# Patient Record
Sex: FEMALE
[De-identification: names, ages, dates, MRNs, and addresses within clinical notes are randomized per-mention and may not be internally consistent; named-entity substitution may affect disease eponyms.]

---

## 2017-03-15 PROBLEM — Z00.00 ENCOUNTER FOR PREVENTIVE HEALTH EXAMINATION: Status: ACTIVE | Noted: 2017-03-15

## 2017-04-07 ENCOUNTER — APPOINTMENT (OUTPATIENT)
Dept: OTOLARYNGOLOGY | Facility: CLINIC | Age: 72
End: 2017-04-07

## 2017-04-07 VITALS
SYSTOLIC BLOOD PRESSURE: 122 MMHG | HEIGHT: 64.5 IN | DIASTOLIC BLOOD PRESSURE: 80 MMHG | WEIGHT: 190 LBS | HEART RATE: 87 BPM | BODY MASS INDEX: 32.04 KG/M2

## 2017-04-07 DIAGNOSIS — Z87.891 PERSONAL HISTORY OF NICOTINE DEPENDENCE: ICD-10-CM

## 2017-04-07 DIAGNOSIS — I10 ESSENTIAL (PRIMARY) HYPERTENSION: ICD-10-CM

## 2017-04-07 DIAGNOSIS — Z85.028 PERSONAL HISTORY OF OTHER MALIGNANT NEOPLASM OF STOMACH: ICD-10-CM

## 2017-04-07 DIAGNOSIS — E11.9 TYPE 2 DIABETES MELLITUS W/OUT COMPLICATIONS: ICD-10-CM

## 2017-04-07 DIAGNOSIS — Z83.3 FAMILY HISTORY OF DIABETES MELLITUS: ICD-10-CM

## 2017-04-07 RX ORDER — GLIPIZIDE 2.5 MG/1
TABLET ORAL
Refills: 0 | Status: ACTIVE | COMMUNITY

## 2017-04-07 RX ORDER — VITAMIN E ACETATE 670 MG
CAPSULE ORAL
Refills: 0 | Status: ACTIVE | COMMUNITY

## 2017-04-10 PROBLEM — I10 HYPERTENSION: Status: ACTIVE | Noted: 2017-04-10

## 2017-04-10 PROBLEM — E11.9 DIABETES: Status: ACTIVE | Noted: 2017-04-10

## 2017-04-10 PROBLEM — Z87.891 FORMER SMOKER: Status: ACTIVE | Noted: 2017-04-07

## 2017-04-10 RX ORDER — VALSARTAN AND HYDROCHLOROTHIAZIDE 160; 25 MG/1; MG/1
160-25 TABLET, FILM COATED ORAL
Qty: 90 | Refills: 0 | Status: ACTIVE | COMMUNITY
Start: 2016-09-13

## 2017-04-10 RX ORDER — METFORMIN HYDROCHLORIDE 1000 MG/1
1000 TABLET, COATED ORAL
Qty: 180 | Refills: 0 | Status: ACTIVE | COMMUNITY
Start: 2016-09-13

## 2017-05-19 VITALS
HEART RATE: 53 BPM | HEIGHT: 63 IN | RESPIRATION RATE: 17 BRPM | DIASTOLIC BLOOD PRESSURE: 67 MMHG | TEMPERATURE: 97 F | WEIGHT: 184.97 LBS | OXYGEN SATURATION: 96 % | SYSTOLIC BLOOD PRESSURE: 152 MMHG

## 2017-05-19 NOTE — ASU PATIENT PROFILE, ADULT - PMH
Heart murmur    Stomach cancer    Type 2 diabetes mellitus Fibroids    Heart murmur    HTN (hypertension)    Stomach cancer    Thyroid goiter    Type 2 diabetes mellitus

## 2017-05-19 NOTE — ASU PATIENT PROFILE, ADULT - PSH
History of surgery  stomach cancer  S/P complete hysterectomy History of surgery  stomach cancer- 2012  S/P complete hysterectomy

## 2017-05-22 ENCOUNTER — OUTPATIENT (OUTPATIENT)
Dept: OUTPATIENT SERVICES | Facility: HOSPITAL | Age: 72
LOS: 1 days | Discharge: HOME | End: 2017-05-22
Payer: MEDICARE

## 2017-05-22 ENCOUNTER — RESULT REVIEW (OUTPATIENT)
Age: 72
End: 2017-05-22

## 2017-05-22 ENCOUNTER — APPOINTMENT (OUTPATIENT)
Dept: OTOLARYNGOLOGY | Facility: HOSPITAL | Age: 72
End: 2017-05-22

## 2017-05-22 DIAGNOSIS — D64.9 ANEMIA, UNSPECIFIED: ICD-10-CM

## 2017-05-22 DIAGNOSIS — Z79.899 OTHER LONG TERM (CURRENT) DRUG THERAPY: ICD-10-CM

## 2017-05-22 DIAGNOSIS — E66.9 OBESITY, UNSPECIFIED: ICD-10-CM

## 2017-05-22 DIAGNOSIS — Z98.890 OTHER SPECIFIED POSTPROCEDURAL STATES: Chronic | ICD-10-CM

## 2017-05-22 DIAGNOSIS — E11.9 TYPE 2 DIABETES MELLITUS WITHOUT COMPLICATIONS: ICD-10-CM

## 2017-05-22 DIAGNOSIS — C73 MALIGNANT NEOPLASM OF THYROID GLAND: ICD-10-CM

## 2017-05-22 DIAGNOSIS — Z90.710 ACQUIRED ABSENCE OF BOTH CERVIX AND UTERUS: Chronic | ICD-10-CM

## 2017-05-22 LAB
CALCIUM SERPL-MCNC: 8.9 MG/DL — SIGNIFICANT CHANGE UP (ref 8.4–10.5)
PTH-INTACT IO % DIF SERPL: 60 PG/ML — SIGNIFICANT CHANGE UP (ref 8.5–72.5)

## 2017-05-22 PROCEDURE — 60240 REMOVAL OF THYROID: CPT | Mod: GC

## 2017-05-22 RX ORDER — DEXTROSE 50 % IN WATER 50 %
1 SYRINGE (ML) INTRAVENOUS ONCE
Qty: 0 | Refills: 0 | Status: DISCONTINUED | OUTPATIENT
Start: 2017-05-22 | End: 2017-05-23

## 2017-05-22 RX ORDER — MORPHINE SULFATE 50 MG/1
4 CAPSULE, EXTENDED RELEASE ORAL
Qty: 0 | Refills: 0 | Status: DISCONTINUED | OUTPATIENT
Start: 2017-05-22 | End: 2017-05-23

## 2017-05-22 RX ORDER — SODIUM CHLORIDE 9 MG/ML
1000 INJECTION, SOLUTION INTRAVENOUS
Qty: 0 | Refills: 0 | Status: DISCONTINUED | OUTPATIENT
Start: 2017-05-22 | End: 2017-05-23

## 2017-05-22 RX ORDER — INSULIN LISPRO 100/ML
VIAL (ML) SUBCUTANEOUS ONCE
Qty: 0 | Refills: 0 | Status: COMPLETED | OUTPATIENT
Start: 2017-05-22 | End: 2017-05-23

## 2017-05-22 RX ORDER — ONDANSETRON 8 MG/1
4 TABLET, FILM COATED ORAL EVERY 6 HOURS
Qty: 0 | Refills: 0 | Status: DISCONTINUED | OUTPATIENT
Start: 2017-05-22 | End: 2017-05-23

## 2017-05-22 RX ORDER — GLUCAGON INJECTION, SOLUTION 0.5 MG/.1ML
1 INJECTION, SOLUTION SUBCUTANEOUS ONCE
Qty: 0 | Refills: 0 | Status: DISCONTINUED | OUTPATIENT
Start: 2017-05-22 | End: 2017-05-23

## 2017-05-22 RX ORDER — ACETAMINOPHEN 500 MG
650 TABLET ORAL ONCE
Qty: 0 | Refills: 0 | Status: COMPLETED | OUTPATIENT
Start: 2017-05-22 | End: 2017-05-23

## 2017-05-22 RX ORDER — DEXTROSE 50 % IN WATER 50 %
25 SYRINGE (ML) INTRAVENOUS ONCE
Qty: 0 | Refills: 0 | Status: DISCONTINUED | OUTPATIENT
Start: 2017-05-22 | End: 2017-05-23

## 2017-05-22 RX ORDER — ACETAMINOPHEN WITH CODEINE 300MG-30MG
1 TABLET ORAL EVERY 4 HOURS
Qty: 0 | Refills: 0 | Status: DISCONTINUED | OUTPATIENT
Start: 2017-05-22 | End: 2017-05-23

## 2017-05-22 RX ORDER — INSULIN LISPRO 100/ML
VIAL (ML) SUBCUTANEOUS ONCE
Qty: 0 | Refills: 0 | Status: DISCONTINUED | OUTPATIENT
Start: 2017-05-22 | End: 2017-05-22

## 2017-05-22 RX ORDER — VALSARTAN 80 MG/1
160 TABLET ORAL DAILY
Qty: 0 | Refills: 0 | Status: DISCONTINUED | OUTPATIENT
Start: 2017-05-22 | End: 2017-05-23

## 2017-05-22 RX ORDER — DEXTROSE 50 % IN WATER 50 %
12.5 SYRINGE (ML) INTRAVENOUS ONCE
Qty: 0 | Refills: 0 | Status: DISCONTINUED | OUTPATIENT
Start: 2017-05-22 | End: 2017-05-23

## 2017-05-22 NOTE — BRIEF OPERATIVE NOTE - SPECIMENS
Total thyroid: 1 long indicates right superior pole, 2 long indicates left superior pole. Total thyroid gland

## 2017-05-22 NOTE — BRIEF OPERATIVE NOTE - PROCEDURE
Thyroidectomy, total  05/22/2017    [facility]  [facility]2017    Active  Elizabeth Humphries

## 2017-05-22 NOTE — BRIEF OPERATIVE NOTE - OPERATION/FINDINGS
Multinodular goiter, left lobe significantly larger in size than right.   Bilateral RLN identified and stimulated at end of case.   No parathyroids seen on specimen.   Dermabond skin closure. Multinodular goiter, left lobe significantly larger in size than right, with nodules occupying most of lobes.   Bilateral RLN identified and preserved; stimulated at end of case.   No parathyroids seen on specimen.   Bilateral superior parathyroid glands were identified and preserved.  Dermabond skin closure.

## 2017-05-22 NOTE — PROGRESS NOTE ADULT - ASSESSMENT
Assessment/Plan:  71y Female s/p total thyroidectomy on  POD0  - pain control  - anti-emetics prn  - regular diet  - ambulate  - discussed case with dr Ovalle who also examined the patient and agrees with the plan    PPX: Enrike, I/S    Dispo plannin hour

## 2017-05-23 VITALS
SYSTOLIC BLOOD PRESSURE: 122 MMHG | DIASTOLIC BLOOD PRESSURE: 56 MMHG | HEART RATE: 54 BPM | OXYGEN SATURATION: 100 % | TEMPERATURE: 99 F | RESPIRATION RATE: 18 BRPM

## 2017-05-23 PROBLEM — R01.1 CARDIAC MURMUR, UNSPECIFIED: Chronic | Status: ACTIVE | Noted: 2017-05-19

## 2017-05-23 PROBLEM — C16.9 MALIGNANT NEOPLASM OF STOMACH, UNSPECIFIED: Chronic | Status: ACTIVE | Noted: 2017-05-19

## 2017-05-23 PROBLEM — E11.9 TYPE 2 DIABETES MELLITUS WITHOUT COMPLICATIONS: Chronic | Status: ACTIVE | Noted: 2017-05-19

## 2017-05-23 LAB — CALCIUM SERPL-MCNC: 9 MG/DL — SIGNIFICANT CHANGE UP (ref 8.4–10.5)

## 2017-05-23 PROCEDURE — 60240 REMOVAL OF THYROID: CPT

## 2017-05-23 PROCEDURE — 82310 ASSAY OF CALCIUM: CPT

## 2017-05-23 PROCEDURE — 36415 COLL VENOUS BLD VENIPUNCTURE: CPT

## 2017-05-23 PROCEDURE — 86850 RBC ANTIBODY SCREEN: CPT

## 2017-05-23 PROCEDURE — 88360 TUMOR IMMUNOHISTOCHEM/MANUAL: CPT

## 2017-05-23 PROCEDURE — 86900 BLOOD TYPING SEROLOGIC ABO: CPT

## 2017-05-23 PROCEDURE — 88307 TISSUE EXAM BY PATHOLOGIST: CPT

## 2017-05-23 PROCEDURE — 83970 ASSAY OF PARATHORMONE: CPT

## 2017-05-23 PROCEDURE — 88341 IMHCHEM/IMCYTCHM EA ADD ANTB: CPT

## 2017-05-23 PROCEDURE — 86901 BLOOD TYPING SEROLOGIC RH(D): CPT

## 2017-05-23 RX ORDER — ACETAMINOPHEN WITH CODEINE 300MG-30MG
1 TABLET ORAL
Qty: 12 | Refills: 0 | OUTPATIENT
Start: 2017-05-23 | End: 2017-05-26

## 2017-05-23 RX ADMIN — Medication 1: at 11:11

## 2017-05-23 RX ADMIN — Medication 650 MILLIGRAM(S): at 04:57

## 2017-05-23 RX ADMIN — VALSARTAN 160 MILLIGRAM(S): 80 TABLET ORAL at 06:18

## 2017-05-23 NOTE — PROGRESS NOTE ADULT - SUBJECTIVE AND OBJECTIVE BOX
ENT [facility] DAILY PROGRESS NOTE    Overnight events/Interval HPI: 71y Female s/p total thyroidectomy on .   POD 1: No acute events post op. Pain controlled. Tolerating PO. Ambulating. Denies nausea/vomiting. Denies numbness/Tingling of hands/fingers. Irizarry michelle dressing removed, neck soft, flat.       Allergies  FRUITS (Unknown)  No Known Drug Allergies      MEDICATIONS:  Antiinfectives:     IV fluids:  lactated ringers. 1000milliLiter(s) IV Continuous <Continuous>  dextrose 5%. 1000milliLiter(s) IV Continuous <Continuous>    Hematologic/Anticoagulation:    Pain medications/Neuro:  morphine  - Injectable 4milliGRAM(s) IV Push every 15 minutes PRN  ondansetron Injectable 4milliGRAM(s) IV Push every 6 hours PRN  acetaminophen 300 mG/codeine 30 mG 1Tablet(s) Oral every 4 hours PRN    Endocrine Medications:   insulin lispro (HumaLOG) corrective regimen sliding scale  SubCutaneous Once  dextrose Gel 1Dose(s) Oral Once PRN  dextrose 50% Injectable 12.5Gram(s) IV Push Once  dextrose 50% Injectable 25Gram(s) IV Push Once  dextrose 50% Injectable 25Gram(s) IV Push Once  glucagon  Injectable 1milliGRAM(s) IntraMuscular Once PRN    All other standing medications:   valsartan 160milliGRAM(s) Oral daily  hydrochlorothiazide   Tablet 25milliGRAM(s) Oral daily    All other PRN medications:  aluminum hydroxide/magnesium hydroxide/simethicone Suspension 30milliLiter(s) Oral Once PRN      Vital Signs Last 24 Hrs  T(C): 37.2, Max: 37.3 ( @ 18:00)  T(F): 98.9, Max: 99.1 ( @ 18:00)  HR: 78 (50 - 78)  BP: 136/67 (131/84 - 189/75)  BP(mean): --  RR: 16 (14 - 16)  SpO2: 100% (98% - 100%)      I & Os for current day (as of  @ 08:26)  =============================================  IN:    lactated ringers.: 1300 ml    Total IN: 1300 ml  ---------------------------------------------  OUT:    Voided: 700 ml    Total OUT: 700 ml  ---------------------------------------------  Total NET: 600 ml        PHYSICAL EXAM:    ENT EXAM-   Constitutional: Well-developed, well-nourished.    Head:  normocephalic, atraumatic.   OC/OP:  Floor of mouth, buccal mucosa, lips, hard palate, soft palate, uvula, posterior pharyngeal wall normal.  Mucosa moist.  Neck:  Incision with steri-strips in place. c/d/i. neck flat and soft.    MULTISYSTEM EXAM-  Neuro/Psych:  A&O x 3.  Mood stable.  Affect appropriate  Cranial nerves: 2-12 grossly intact bilaterally.  Eyes:  EOMI, PERRL no nystagmus.  Pulm:  No dyspnea, non-labored breathing on room air  Cardiovascular: Carotid pulses 2+ bilaterally.  No periphreal edema.  Skin:  No rash or lesions on exposed skin of head/neck      LABS:    Endocrine Panel-  Calcium, Total Serum: 9.0 mg/dL ( @ 06:57)  Calcium, Total Serum: 8.9 mg/dL ( @ 17:04)    PTH Intact, Intraoperative.: 60.0 pg/mL ( @ 17:04)      Assessment/Plan:  71y Female s/p total thyroidectomy on  POD 1.   - pain control  - anti-emetics prn  - regular diet  - ambulate  - d/c home today  - discussed case with dr Ovalle who also examined the patient and agrees with the plan    PPX: SCDs, I/S    Dispo plannin hour, no home care needs

## 2017-05-30 LAB — SURGICAL PATHOLOGY STUDY: SIGNIFICANT CHANGE UP

## 2017-06-01 ENCOUNTER — APPOINTMENT (OUTPATIENT)
Dept: OTOLARYNGOLOGY | Facility: CLINIC | Age: 72
End: 2017-06-01

## 2017-06-01 RX ORDER — LEVOTHYROXINE SODIUM 0.14 MG/1
137 TABLET ORAL DAILY
Qty: 30 | Refills: 2 | Status: ACTIVE | COMMUNITY
Start: 2017-06-01 | End: 1900-01-01

## 2017-08-03 ENCOUNTER — APPOINTMENT (OUTPATIENT)
Dept: OTOLARYNGOLOGY | Facility: CLINIC | Age: 72
End: 2017-08-03
Payer: MEDICARE

## 2017-08-03 VITALS — DIASTOLIC BLOOD PRESSURE: 82 MMHG | BODY MASS INDEX: 30.42 KG/M2 | SYSTOLIC BLOOD PRESSURE: 125 MMHG | WEIGHT: 180 LBS

## 2017-08-03 DIAGNOSIS — E04.2 NONTOXIC MULTINODULAR GOITER: ICD-10-CM

## 2017-08-03 PROCEDURE — 31575 DIAGNOSTIC LARYNGOSCOPY: CPT | Mod: 58

## 2017-08-03 PROCEDURE — 99213 OFFICE O/P EST LOW 20 MIN: CPT | Mod: 25

## 2017-09-07 ENCOUNTER — APPOINTMENT (OUTPATIENT)
Dept: OTOLARYNGOLOGY | Facility: CLINIC | Age: 72
End: 2017-09-07
Payer: MEDICARE

## 2017-09-07 VITALS — DIASTOLIC BLOOD PRESSURE: 79 MMHG | SYSTOLIC BLOOD PRESSURE: 147 MMHG

## 2017-09-07 DIAGNOSIS — K21.0 GASTRO-ESOPHAGEAL REFLUX DISEASE WITH ESOPHAGITIS: ICD-10-CM

## 2017-09-07 PROBLEM — E04.2 GOITER, NONTOXIC, MULTINODULAR: Status: RESOLVED | Noted: 2017-04-07 | Resolved: 2017-09-07

## 2017-09-07 PROCEDURE — 99214 OFFICE O/P EST MOD 30 MIN: CPT | Mod: 25

## 2017-09-07 PROCEDURE — 31575 DIAGNOSTIC LARYNGOSCOPY: CPT

## 2017-09-07 RX ORDER — FAMOTIDINE 20 MG/1
20 TABLET, FILM COATED ORAL
Qty: 30 | Refills: 3 | Status: ACTIVE | COMMUNITY
Start: 2017-09-07 | End: 1900-01-01

## 2017-10-05 ENCOUNTER — APPOINTMENT (OUTPATIENT)
Dept: OTOLARYNGOLOGY | Facility: CLINIC | Age: 72
End: 2017-10-05
Payer: MEDICARE

## 2017-10-05 VITALS
WEIGHT: 180 LBS | SYSTOLIC BLOOD PRESSURE: 138 MMHG | BODY MASS INDEX: 31.89 KG/M2 | DIASTOLIC BLOOD PRESSURE: 80 MMHG | HEIGHT: 63 IN

## 2017-10-05 DIAGNOSIS — C73 MALIGNANT NEOPLASM OF THYROID GLAND: ICD-10-CM

## 2017-10-05 DIAGNOSIS — R49.8 OTHER VOICE AND RESONANCE DISORDERS: ICD-10-CM

## 2017-10-05 DIAGNOSIS — E89.0 POSTPROCEDURAL HYPOTHYROIDISM: ICD-10-CM

## 2017-10-05 DIAGNOSIS — L91.0 HYPERTROPHIC SCAR: ICD-10-CM

## 2017-10-05 PROCEDURE — 11900 INJECT SKIN LESIONS </W 7: CPT

## 2017-10-05 PROCEDURE — 99213 OFFICE O/P EST LOW 20 MIN: CPT | Mod: 25

## 2019-03-08 NOTE — ASU PATIENT PROFILE, ADULT - NSSUBSTANCEUSE_GEN_ALL_CORE_SD
caffeine Pt has h/o Hypothyroidism.  She takes Levothyroxine 100 mcg daily  TSH was found to be low 0.32  Continue home medicaiton  F/u Free T4 level